# Patient Record
Sex: FEMALE | Race: BLACK OR AFRICAN AMERICAN | NOT HISPANIC OR LATINO | Employment: PART TIME | URBAN - METROPOLITAN AREA
[De-identification: names, ages, dates, MRNs, and addresses within clinical notes are randomized per-mention and may not be internally consistent; named-entity substitution may affect disease eponyms.]

---

## 2018-12-14 ENCOUNTER — HOSPITAL ENCOUNTER (EMERGENCY)
Facility: HOSPITAL | Age: 28
Discharge: HOME | End: 2018-12-14
Attending: EMERGENCY MEDICINE
Payer: MEDICARE

## 2018-12-14 VITALS
HEART RATE: 80 BPM | SYSTOLIC BLOOD PRESSURE: 121 MMHG | DIASTOLIC BLOOD PRESSURE: 63 MMHG | HEIGHT: 67 IN | BODY MASS INDEX: 42.38 KG/M2 | TEMPERATURE: 98 F | WEIGHT: 270 LBS | OXYGEN SATURATION: 98 % | RESPIRATION RATE: 20 BRPM

## 2018-12-14 DIAGNOSIS — J20.8 VIRAL BRONCHITIS: Primary | ICD-10-CM

## 2018-12-14 DIAGNOSIS — B34.9 VIRAL SYNDROME: ICD-10-CM

## 2018-12-14 DIAGNOSIS — R05.9 COUGH: ICD-10-CM

## 2018-12-14 LAB
B-HCG UR QL: NEGATIVE
CTP QC/QA: YES
DEPRECATED S PYO AG THROAT QL EIA: NEGATIVE

## 2018-12-14 PROCEDURE — 25000003 PHARM REV CODE 250: Performed by: PHYSICIAN ASSISTANT

## 2018-12-14 PROCEDURE — 87880 STREP A ASSAY W/OPTIC: CPT

## 2018-12-14 PROCEDURE — 99284 EMERGENCY DEPT VISIT MOD MDM: CPT | Mod: 25

## 2018-12-14 PROCEDURE — 81025 URINE PREGNANCY TEST: CPT | Performed by: PHYSICIAN ASSISTANT

## 2018-12-14 PROCEDURE — 87081 CULTURE SCREEN ONLY: CPT

## 2018-12-14 RX ORDER — CETIRIZINE HYDROCHLORIDE 10 MG/1
10 TABLET ORAL DAILY
Qty: 30 TABLET | Refills: 0 | Status: SHIPPED | OUTPATIENT
Start: 2018-12-14

## 2018-12-14 RX ORDER — FLUTICASONE PROPIONATE 50 MCG
1 SPRAY, SUSPENSION (ML) NASAL 2 TIMES DAILY PRN
Qty: 15 G | Refills: 0 | Status: SHIPPED | OUTPATIENT
Start: 2018-12-14

## 2018-12-14 RX ORDER — ACETAMINOPHEN 325 MG/1
650 TABLET ORAL
Status: COMPLETED | OUTPATIENT
Start: 2018-12-14 | End: 2018-12-14

## 2018-12-14 RX ORDER — BENZONATATE 100 MG/1
100 CAPSULE ORAL 3 TIMES DAILY PRN
Qty: 20 CAPSULE | Refills: 0 | Status: SHIPPED | OUTPATIENT
Start: 2018-12-14

## 2018-12-14 RX ORDER — ALBUTEROL SULFATE 90 UG/1
1-2 AEROSOL, METERED RESPIRATORY (INHALATION) EVERY 6 HOURS PRN
Qty: 1 INHALER | Refills: 0 | Status: SHIPPED | OUTPATIENT
Start: 2018-12-14 | End: 2019-12-14

## 2018-12-14 RX ADMIN — ACETAMINOPHEN 650 MG: 325 TABLET ORAL at 11:12

## 2018-12-14 NOTE — ED PROVIDER NOTES
Encounter Date: 12/14/2018    SORT:  28 y.o. female presenting to ED for sore throat and cough.  Here with 2 other family members who have same. Limited triage exam:  Non-toxic. If orders were placed, they are pending.     Kervin Currie PA-C  12/14/2018  10:56 AM   SCRIBE #1 NOTE: I, Kamron Carlisle, am scribing for, and in the presence of,  Arnulfo Busby NP. I have scribed the following portions of the note - Other sections scribed: HPI and ROS.       History     Chief Complaint   Patient presents with    Sore Throat     sore throat and cough x 2 wks     CC: Sore Throat    HPI: This 28 y.o F with no pertinent PMHx presents to the ED c/o acute onset of a constant and moderate (6/10) sore throat, cough, and clear rhinorrhea x2 weeks. She does note that her sore throat is worse when she wakes up in the AM. Her  and daughter are also in the ED for similar symptoms. She reports that their symptoms began after moving into a new house. She states that the landlord then tore down a few walls and left them exposed. They slept in the house with the exposed walls and her symptoms were seemingly worse the next day.  She denies fever, chills, diaphoresis, nausea, emesis, diarrhea, chest pain, nasal congestion, rhinorrhea, dysuria, difficulty urinating, abdominal pain, SOB and rash. She attempted tx with Day Quil yesterday with no relief. She also attempted tx with Tylenol and Ibuprofen with no relief. No attempted tx today.       The history is provided by the patient. No  was used.     Review of patient's allergies indicates:  No Known Allergies  Past Medical History:   Diagnosis Date    Anemia     Blood transfusion, without reported diagnosis     x2     History reviewed. No pertinent surgical history.  Family History   Problem Relation Age of Onset    Mental illness Mother     Hypertension Maternal Grandmother     Heart disease Maternal Grandmother     Diabetes Maternal Grandmother      Hyperlipidemia Paternal Grandmother     COPD Neg Hx     Early death Neg Hx     Kidney disease Neg Hx     Breast cancer Neg Hx     Colon cancer Neg Hx     Ovarian cancer Neg Hx      Social History     Tobacco Use    Smoking status: Never Smoker    Smokeless tobacco: Never Used   Substance Use Topics    Alcohol use: No    Drug use: No     Review of Systems   Constitutional: Negative for chills, diaphoresis and fever.   HENT: Positive for postnasal drip, rhinorrhea and sore throat. Negative for congestion, sinus pressure, sinus pain, trouble swallowing and voice change.    Eyes: Negative for redness.   Respiratory: Positive for cough. Negative for shortness of breath and wheezing.    Cardiovascular: Negative for chest pain.   Gastrointestinal: Negative for abdominal pain, diarrhea, nausea and vomiting.   Genitourinary: Negative for dysuria, frequency and urgency.   Musculoskeletal: Negative for back pain and neck pain.   Skin: Negative for rash.   Neurological: Negative for dizziness and light-headedness.   Psychiatric/Behavioral: The patient is not nervous/anxious.        Physical Exam     Initial Vitals [12/14/18 1053]   BP Pulse Resp Temp SpO2   121/74 107 20 97.9 °F (36.6 °C) 98 %      MAP       --         Physical Exam    Vitals reviewed.  Constitutional: Vital signs are normal. She appears well-developed and well-nourished. She is not diaphoretic. She is active and cooperative.  Non-toxic appearance. She does not have a sickly appearance. She does not appear ill. No distress.   Pleasant, afebrile, well appearing, in no distress   HENT:   Head: Normocephalic and atraumatic.   Right Ear: External ear normal.   Left Ear: External ear normal.   Nose: Nose normal.   Eyes: Conjunctivae and EOM are normal. Right eye exhibits no discharge. Left eye exhibits no discharge.   Neck: Normal range of motion. Neck supple. No tracheal deviation present.   Cardiovascular: Normal rate.   Pulmonary/Chest: Effort normal  and breath sounds normal. No accessory muscle usage or stridor. No tachypnea. No respiratory distress.   Lungs are clear to auscultation bilaterally in all fields.  No wheezing.  No accessory muscle usage, tachypnea, or increased work of breathing.  No respiratory distress. Nonproductive reactive cough with deep inspiration.   Abdominal: Soft. She exhibits no distension. There is no tenderness.   Musculoskeletal: Normal range of motion. She exhibits no tenderness.   Neurological: She is alert and oriented to person, place, and time. She has normal strength. No cranial nerve deficit or sensory deficit.   Skin: Skin is warm and dry.   Psychiatric: She has a normal mood and affect. Her behavior is normal. Judgment and thought content normal.         ED Course   Procedures  Labs Reviewed   THROAT SCREEN, RAPID   CULTURE, STREP A,  THROAT   POCT URINE PREGNANCY          Imaging Results          X-Ray Chest PA And Lateral (Final result)  Result time 12/14/18 11:57:24    Final result by William Pineda Jr., MD (12/14/18 11:57:24)                 Impression:      No confluent consolidations seen.  Increased parenchymal markings lower lung fields on the frontal projection likely overlying soft tissues.      Electronically signed by: William Pineda MD  Date:    12/14/2018  Time:    11:57             Narrative:    EXAMINATION:  XR CHEST PA AND LATERAL    CLINICAL HISTORY:  Cough    TECHNIQUE:  PA and lateral views of the chest were performed.    COMPARISON:  None    FINDINGS:  Heart size pulmonary vessels are normal.  The lungs fairly well aerated.  Increased parenchymal markings in the lower lung zones likely related to overlying soft tissues.  No convincing abnormality on the lateral.  No pleural fluid.  Bones are intact.                                 Medical Decision Making:   History:   Old Medical Records: I decided to obtain old medical records.  Differential Diagnosis:   Influenza, bronchitis, pneumonia, sinusitis,  otitis media, otitis externa, pharyngitis, others  Clinical Tests:   Lab Tests: Ordered and Reviewed  Radiological Study: Ordered and Reviewed  ED Management:  28-year-old female presenting for evaluation of clear rhinorrhea, sore throat that is worse at night when lying down, and nonproductive cough.  She feels that her symptoms have been present since moving into a new house recently.  She believes there may be something in the house causing her symptoms.  She presents with her  and daughter who are exhibiting similar symptoms. She is afebrile, well-appearing, and in no distress.  Vitals are stable and within normal limits.  Pleasant and talkative.  TMs and ear canals normal bilaterally.  No oropharyngeal abnormalities.  No frontal or maxillary sinus tenderness. There is clear rhinorrhea present.  Lungs are clear to auscultation bilaterally in all fields.  No respiratory distress. O2 sats are normal on room air.  Reactive cough heard with deep inspiration.  Chest x-ray shows no focal consolidation.  No evidence of pneumonia.  Strep swab is negative.  Given that symptoms have been ongoing for 2 weeks and that patient is afebrile and well-appearing I do not feel that influenza testing is warranted.    Uncertain whether not patient's home environment is contributing to symptoms. I advised the patient and the rest of the patient's family to stay with family or friends for several days if possible to see if symptoms improve.  Symptoms are clinically consistent with viral respiratory infection.  Will treat symptomatically.  Advised patient to follow up with her PCP as soon as possible for re-evaluation and further treatment and to return to the ED immediately for any new or worsening symptoms or any change in condition. All questions regarding diagnosis and plan were answered to the patient's fullest possible satisfaction. Patient expressed understanding of diagnosis, discharge instructions, and return  precautions.    My attending physician was available for consultation during this case.            Scribe Attestation:   Scribe #1: I performed the above scribed service and the documentation accurately describes the services I performed. I attest to the accuracy of the note.    Attending Attestation:           Physician Attestation for Scribe:  Physician Attestation Statement for Scribe #1: I, Arnulfo Busby NP, reviewed documentation, as scribed by Kamron Carlisle in my presence, and it is both accurate and complete.                    Clinical Impression:   The primary encounter diagnosis was Viral bronchitis. Diagnoses of Cough and Viral syndrome were also pertinent to this visit.      Disposition:   Disposition: Discharged  Condition: Stable                        Arnulfo Busby NP  12/14/18 [id number]

## 2018-12-14 NOTE — ED TRIAGE NOTES
The pt states her cough started 2 weeks ago. She just moved. The landlord was at her house yesterday, found mold in the downstairs guest bathroom and kitchen. Reports she has been coughing since the demo.

## 2018-12-14 NOTE — DISCHARGE INSTRUCTIONS
Take all medications as needed only as prescribed.    Follow-up with your regular doctor for re-evaluation and further treatment.    Return to the emergency department immediately for any new or worsening symptoms or as needed for any additional concerns.

## 2018-12-16 LAB — BACTERIA THROAT CULT: NORMAL
